# Patient Record
Sex: MALE | Race: OTHER | Employment: FULL TIME | URBAN - METROPOLITAN AREA
[De-identification: names, ages, dates, MRNs, and addresses within clinical notes are randomized per-mention and may not be internally consistent; named-entity substitution may affect disease eponyms.]

---

## 2017-09-20 ENCOUNTER — HOSPITAL ENCOUNTER (EMERGENCY)
Facility: HOSPITAL | Age: 62
Discharge: HOME | End: 2017-09-20
Attending: EMERGENCY MEDICINE
Payer: MEDICAID

## 2017-09-20 VITALS
TEMPERATURE: 98 F | DIASTOLIC BLOOD PRESSURE: 62 MMHG | WEIGHT: 180 LBS | SYSTOLIC BLOOD PRESSURE: 139 MMHG | HEART RATE: 75 BPM | HEIGHT: 67 IN | RESPIRATION RATE: 18 BRPM | BODY MASS INDEX: 28.25 KG/M2 | OXYGEN SATURATION: 95 %

## 2017-09-20 DIAGNOSIS — T18.108A FOREIGN BODY IN ESOPHAGUS, INITIAL ENCOUNTER: Primary | ICD-10-CM

## 2017-09-20 PROCEDURE — 96361 HYDRATE IV INFUSION ADD-ON: CPT

## 2017-09-20 PROCEDURE — 99284 EMERGENCY DEPT VISIT MOD MDM: CPT

## 2017-09-20 PROCEDURE — 96374 THER/PROPH/DIAG INJ IV PUSH: CPT

## 2017-09-20 PROCEDURE — 96375 TX/PRO/DX INJ NEW DRUG ADDON: CPT

## 2017-09-20 RX ORDER — PANTOPRAZOLE SODIUM 40 MG/1
40 TABLET, DELAYED RELEASE ORAL
Qty: 60 TABLET | Refills: 0 | Status: SHIPPED | OUTPATIENT
Start: 2017-09-20 | End: 2017-10-20

## 2017-09-20 RX ORDER — DIAZEPAM 5 MG/ML
5 INJECTION, SOLUTION INTRAMUSCULAR; INTRAVENOUS ONCE
Status: COMPLETED | OUTPATIENT
Start: 2017-09-20 | End: 2017-09-20

## 2017-09-20 NOTE — ED INITIAL ASSESSMENT (HPI)
Patient received via EMS with c/o cantalope stuck in throat. Patient spitting up saliva, no respiratory distress present, speaking in full sentances.

## 2017-09-20 NOTE — ED PROVIDER NOTES
Patient Seen in: [facility] AND [facility] Emergency Department    History   Patient presents with:  FB in Throat (GI, respiratory)    Stated Complaint: foreign bodu in throat     HPI    62-year-old male presents for complaint of a foreign body in his throat.   P 1953]    Current:/62   Pulse 75   Temp 98 °F (36.7 °C)   Resp 18   Ht 170.2 cm (5' 7\")   Wt 81.6 kg   SpO2 95%   BMI 28.19 kg/m²         Physical Exam   Constitutional: He is oriented to person, place, and time.  He appears well-developed and well-no discharged home tolerating oral intake without difficulty and in no respiratory distress. Imaging:   No results found. SpO2: Normal 95%    Clinical impression as well as any lab results and radiology findings were discussed with the patient.  Kirstin

## 2017-09-21 ENCOUNTER — TELEPHONE (OUTPATIENT)
Dept: GASTROENTEROLOGY | Facility: CLINIC | Age: 62
End: 2017-09-21

## 2017-09-21 NOTE — TELEPHONE ENCOUNTER
PB,     Please see Dr. Meliton Falk message below. Ok to add for tomorrow? Please advise, thank you.

## 2017-09-21 NOTE — TELEPHONE ENCOUNTER
Dr. Sonja Escalante, should this be something that I see? Is this a upper endoscopy with possible dilatation? Would you like us to overbook schedule for an expedited upper endoscopy. Please let me know.  If necessary, I can see him tomorrow at 9:30 AM, I would just l

## 2017-09-21 NOTE — TELEPHONE ENCOUNTER
Pt contacted and reviewed that it is the PATIENT responsibility to bring his workman's compensation information on the day of his appt, otherwise he will be self-pay of [facility] will be billed for his visit.      I reviewed that it is a consult to discuss the EG

## 2017-09-21 NOTE — TELEPHONE ENCOUNTER
LMTCB-    CSS/YANIV please offer appt with PB for 9/22/17 @ 9:30am (appt on hold), must arrive by 9:15am, thank you.

## 2017-09-21 NOTE — TELEPHONE ENCOUNTER
Patient with a transient food impaction (that passed in the ER without EGD). He needs f/u in GI clinic to discuss EGD. GI Clinical Staff:   Please call patient and schedule with any provider, ideally in next 1-2 weeks.

## 2017-09-21 NOTE — TELEPHONE ENCOUNTER
Pt states that he contacted HR and they stated this is an official work comp claim. Pt would like for office to contact [facility] [phone] in the HR department to obtain any information needed for pts claim.  Pt is requesting a call back regarding

## 2017-09-21 NOTE — TELEPHONE ENCOUNTER
ASHLEY Morris- yes this is a straight forward dysphagia patient that needs to be counseled on mechanical soft diet and taking PPI until EGD can be done to r/out malignancy. He came in last night to EGD with impaction that naturally passed.   If stricture on EGD

## 2017-09-21 NOTE — TELEPHONE ENCOUNTER
No sooner appt available with other providers. Pt has [facility] and cannot be seen by Dr. Shahida Garcia to add to your schedule for Friday, 9/22/17 @ 9:30am?    Please advise, thank you.

## 2017-09-21 NOTE — TELEPHONE ENCOUNTER
Pt ret'd call. Pt accepted appt on 9/22/17 at [facility] with PB. Appt added to schedule. He will arrive at 9:15am.  Pt states this will be work comp and he was told to call with all information re: work comp claim.   He was advised that he would be self pay or

## 2017-09-21 NOTE — ED NOTES
Pt was able to spit out a small pice of cantaloupe but still feels like there is still some stuck, pt remedicated with valium 5mg ivp.

## 2017-09-22 ENCOUNTER — OFFICE VISIT (OUTPATIENT)
Dept: GASTROENTEROLOGY | Facility: CLINIC | Age: 62
End: 2017-09-22

## 2017-09-22 ENCOUNTER — TELEPHONE (OUTPATIENT)
Dept: GASTROENTEROLOGY | Facility: CLINIC | Age: 62
End: 2017-09-22

## 2017-09-22 VITALS
WEIGHT: 185 LBS | SYSTOLIC BLOOD PRESSURE: 132 MMHG | HEIGHT: 66 IN | HEART RATE: 70 BPM | BODY MASS INDEX: 29.73 KG/M2 | DIASTOLIC BLOOD PRESSURE: 71 MMHG

## 2017-09-22 DIAGNOSIS — R13.10 DYSPHAGIA, UNSPECIFIED TYPE: Primary | ICD-10-CM

## 2017-09-22 DIAGNOSIS — R13.10 ODYNOPHAGIA: ICD-10-CM

## 2017-09-22 PROCEDURE — 99213 OFFICE O/P EST LOW 20 MIN: CPT | Performed by: PHYSICIAN ASSISTANT

## 2017-09-22 PROCEDURE — 99244 OFF/OP CNSLTJ NEW/EST MOD 40: CPT | Performed by: PHYSICIAN ASSISTANT

## 2017-09-22 PROCEDURE — 99212 OFFICE O/P EST SF 10 MIN: CPT | Performed by: PHYSICIAN ASSISTANT

## 2017-09-22 RX ORDER — IRBESARTAN AND HYDROCHLOROTHIAZIDE 300; 12.5 MG/1; MG/1
1 TABLET, FILM COATED ORAL DAILY
COMMUNITY

## 2017-09-22 NOTE — TELEPHONE ENCOUNTER
Scheduled for:  EGD w/poss Dil  Provider Name: Dr. Chu Vargas  Date:  9/27/17  Location:  [facility]  Sedation:  MAC  Time:  9689 (pt is aware to arrive at 1215)   Prep:  Nothing to eat after midnight   Nothing to drink after 0900 the day of the procedure  Meds/Allerg

## 2017-09-22 NOTE — H&P
[address] [facility] [address] Gastroenterology                                                                                                  Clinic History and Physical     Pa No nausea, vomiting, hematemesis or epigastric pain. Denies blood in his stool, black stools, change in bowel habits or abdominal pain. Never undergone c-scope - has been advised by Dr. Keaton Montanez. No unintentional weight loss, CP or SOB.  He has been eating a Allergies    ROS:   CONSTITUTIONAL:  negative for fevers, rigors  EYES:  negative for diplopia   RESPIRATORY:  negative for severe shortness of breath  CARDIOVASCULAR:  negative for crushing sub-sternal chest pain  GASTROINTESTINAL:  see HPI  GENITOURINARY improvement. Request was made by ER and Dr. Ingrid Shane for the patient to have prompt evaluation outpatient for consideration of upper endoscopy to evaluate for stricture, malignancy or injury to esophagus. No labs or imaging on file.  Patient presents today fee answered to the patient’s satisfaction. The patient has agreed to sign an informed consent and elected to proceed with upper endoscopy/enteroscopy with possible intervention [i.e. polypectomy, ablation, stent placement, etc.] as indicated.         Orders Th

## 2017-09-22 NOTE — PATIENT INSTRUCTIONS
1. Schedule an upper endoscopy with Dr. Catalina Nye with MAC sedation (possible dilation). - September 26th - this was OK by Dr. Catalina Nye    2. Continue taking the Pantoprazole medication two times daily as prescribed.  Continue on the mechanical soft diet as we d

## 2017-09-27 ENCOUNTER — HOSPITAL ENCOUNTER (OUTPATIENT)
Facility: HOSPITAL | Age: 62
Setting detail: HOSPITAL OUTPATIENT SURGERY
Discharge: HOME | End: 2017-09-27
Attending: INTERNAL MEDICINE | Admitting: INTERNAL MEDICINE
Payer: MEDICAID

## 2017-09-27 ENCOUNTER — ANESTHESIA (OUTPATIENT)
Dept: ENDOSCOPY | Facility: HOSPITAL | Age: 62
End: 2017-09-27
Payer: MEDICAID

## 2017-09-27 ENCOUNTER — ANESTHESIA EVENT (OUTPATIENT)
Dept: ENDOSCOPY | Facility: HOSPITAL | Age: 62
End: 2017-09-27
Payer: MEDICAID

## 2017-09-27 ENCOUNTER — SURGERY (OUTPATIENT)
Age: 62
End: 2017-09-27

## 2017-09-27 DIAGNOSIS — K44.9 HIATAL HERNIA WITHOUT GANGRENE AND OBSTRUCTION: ICD-10-CM

## 2017-09-27 DIAGNOSIS — R13.10 ODYNOPHAGIA: ICD-10-CM

## 2017-09-27 DIAGNOSIS — R13.10 DYSPHAGIA, UNSPECIFIED TYPE: ICD-10-CM

## 2017-09-27 DIAGNOSIS — K22.81 ESOPHAGEAL POLYP: Primary | ICD-10-CM

## 2017-09-27 DIAGNOSIS — K22.2 SCHATZKI'S RING: ICD-10-CM

## 2017-09-27 PROCEDURE — 0DB18ZX EXCISION OF UPPER ESOPHAGUS, VIA NATURAL OR ARTIFICIAL OPENING ENDOSCOPIC, DIAGNOSTIC: ICD-10-PCS | Performed by: INTERNAL MEDICINE

## 2017-09-27 PROCEDURE — 43239 EGD BIOPSY SINGLE/MULTIPLE: CPT | Performed by: INTERNAL MEDICINE

## 2017-09-27 PROCEDURE — 0DB48ZX EXCISION OF ESOPHAGOGASTRIC JUNCTION, VIA NATURAL OR ARTIFICIAL OPENING ENDOSCOPIC, DIAGNOSTIC: ICD-10-PCS | Performed by: INTERNAL MEDICINE

## 2017-09-27 PROCEDURE — 0DB68ZX EXCISION OF STOMACH, VIA NATURAL OR ARTIFICIAL OPENING ENDOSCOPIC, DIAGNOSTIC: ICD-10-PCS | Performed by: INTERNAL MEDICINE

## 2017-09-27 RX ORDER — NALOXONE HYDROCHLORIDE 0.4 MG/ML
80 INJECTION, SOLUTION INTRAMUSCULAR; INTRAVENOUS; SUBCUTANEOUS AS NEEDED
Status: DISCONTINUED | OUTPATIENT
Start: 2017-09-27 | End: 2017-09-27

## 2017-09-27 RX ORDER — ONDANSETRON 2 MG/ML
4 INJECTION INTRAMUSCULAR; INTRAVENOUS ONCE AS NEEDED
Status: DISCONTINUED | OUTPATIENT
Start: 2017-09-27 | End: 2017-09-27

## 2017-09-27 RX ORDER — LIDOCAINE HYDROCHLORIDE 10 MG/ML
INJECTION, SOLUTION EPIDURAL; INFILTRATION; INTRACAUDAL; PERINEURAL AS NEEDED
Status: DISCONTINUED | OUTPATIENT
Start: 2017-09-27 | End: 2017-09-27 | Stop reason: SURG

## 2017-09-27 RX ORDER — SODIUM CHLORIDE, SODIUM LACTATE, POTASSIUM CHLORIDE, CALCIUM CHLORIDE 600; 310; 30; 20 MG/100ML; MG/100ML; MG/100ML; MG/100ML
INJECTION, SOLUTION INTRAVENOUS CONTINUOUS
Status: DISCONTINUED | OUTPATIENT
Start: 2017-09-27 | End: 2017-09-27

## 2017-09-27 RX ADMIN — SODIUM CHLORIDE, SODIUM LACTATE, POTASSIUM CHLORIDE, CALCIUM CHLORIDE: 600; 310; 30; 20 INJECTION, SOLUTION INTRAVENOUS at 15:59:00

## 2017-09-27 RX ADMIN — LIDOCAINE HYDROCHLORIDE 50 MG: 10 INJECTION, SOLUTION EPIDURAL; INFILTRATION; INTRACAUDAL; PERINEURAL at 16:00:00

## 2017-09-27 RX ADMIN — SODIUM CHLORIDE, SODIUM LACTATE, POTASSIUM CHLORIDE, CALCIUM CHLORIDE: 600; 310; 30; 20 INJECTION, SOLUTION INTRAVENOUS at 16:20:00

## 2017-09-27 NOTE — ANESTHESIA PREPROCEDURE EVALUATION
Anesthesia PreOp Note    HPI:     Yasemin Age is a 62year old male who presents for preoperative consultation requested by: Justin Shane MD    Date of Surgery: 9/27/2017    Procedure(s):  ESOPHAGOGASTRODUODENOSCOPY (EGD)  Indication: Odynophagia  Dy 09/27/17  1302 09/27/17  1314   BP:  156/71   BP Location:  Left arm   Pulse:  60   Resp:  11   SpO2:  100%   Weight: 81.6 kg (180 lb)    Height: 1.702 m (5' 7\")         Anesthesia ROS/Med Hx and Physical Exam     Patient summary reviewed and Nursing not

## 2017-09-27 NOTE — OPERATIVE REPORT
ESOPHAGOGASTRODUODENOSCOPY (EGD) REPORT    [place]    MARC 1955 Age 62year old   PCP Shantel Naik Endoscopist Luz Tolentino MD     Date of procedure: 17    Procedure: EGD w/biopsy    Pre-operative diagnosis: Dysphagia, hx of food s/p random biopsies. Retroflexion revealed a normal fundus and a non-patulous cardia. 3. Duodenum: The duodenal mucosa appeared normal in the 1st and 2nd portion of the duodenum. Impression:  1.  The etiology of dysphagia and transient food impactio

## 2017-09-27 NOTE — H&P
History & Physical Examination    Patient Name: Adrienne Medina  MRN: [id number]  [facility]: [id number]  YOB: 1955    Diagnosis: dysphagia, hx of food impaction      Prescriptions Prior to Admission:  Irbesartan-Hydrochlorothiazide 300-12.5 MG Oral

## 2017-09-27 NOTE — ANESTHESIA POSTPROCEDURE EVALUATION
Patient: [facility]    Procedure Summary     Date:  09/27/17 Room / Location:  [address] [place] [address] ENDOSCOPY 05 / [address] [place] [address] ENDOSCOPY    Anesthesia Start:  9258 Anesthesia Stop:  8408    Procedure:  ESOPHAGOGASTRODUODENOSCOPY (EGD) (N/A ) Diagnosis:       Odynophagia      Dy

## 2017-09-28 ENCOUNTER — TELEPHONE (OUTPATIENT)
Dept: GASTROENTEROLOGY | Facility: CLINIC | Age: 62
End: 2017-09-28

## 2017-09-28 VITALS
BODY MASS INDEX: 28.25 KG/M2 | OXYGEN SATURATION: 100 % | WEIGHT: 180 LBS | HEART RATE: 72 BPM | DIASTOLIC BLOOD PRESSURE: 72 MMHG | HEIGHT: 67 IN | RESPIRATION RATE: 19 BRPM | SYSTOLIC BLOOD PRESSURE: 130 MMHG

## 2017-09-28 NOTE — TELEPHONE ENCOUNTER
Sent call to Rn - Pt has Discomfort after eating - He had 9/27/2017 EGD with ROSEANN Panchal call. Thank you.

## 2017-09-28 NOTE — TELEPHONE ENCOUNTER
Spoke w/pt and he was concerned b/c he had scrambled eggs this morning and about an hour afterwards he felt the sensation of \"something stuck in my throat, like what happened last week,\" also feels pressure around the chest area, and tightness around the

## 2017-09-28 NOTE — TELEPHONE ENCOUNTER
Noted, thank you. I called the pt back and he states he has some tea and honey and his discomfort was slowly improving. States he is having pasta for dinner and has not had any difficulty at this time.  I reviewed ER s/s again with him, he verbalized un

## 2017-10-02 ENCOUNTER — TELEPHONE (OUTPATIENT)
Dept: GASTROENTEROLOGY | Facility: CLINIC | Age: 62
End: 2017-10-02

## 2017-10-02 NOTE — TELEPHONE ENCOUNTER
Entered into EPIC:Recall EGD in approx 1-2 years per Dr. Chu Vargas. Last EGD done 9/27/17, next due 1/2019. Snapshot updated.

## 2017-10-02 NOTE — TELEPHONE ENCOUNTER
Left detailed msg for patient re: EGD biopsies showing non-dysplastic french's esophagus. No cancer is seen, no dysplasia is present. He should continue protonix 40mg/day and repeat EGD in 1-2 years.     GI Clinical Staff:   Please place recall EGD in [facility]

## 2018-11-01 ENCOUNTER — TELEPHONE (OUTPATIENT)
Dept: GASTROENTEROLOGY | Facility: CLINIC | Age: 63
End: 2018-11-01

## 2018-11-01 NOTE — TELEPHONE ENCOUNTER
----- Message from Asa Lovelace RN sent at 10/2/2017  3:05 PM CDT -----  Regardin-2 year EGD recall  Entered into EPIC:Recall EGD in approx 1-2 years per Dr. Severa Abed. Last EGD done 17, next due 2019. Snapshot updated.

## (undated) DEVICE — FORCEP RADIAL JAW 4

## (undated) DEVICE — ENDOSCOPY PACK UPPER: Brand: MEDLINE INDUSTRIES, INC.

## (undated) DEVICE — Device: Brand: DEFENDO AIR/WATER/SUCTION AND BIOPSY VALVE

## (undated) NOTE — ED AVS SNAPSHOT
Kenneth Gamboa   MRN: [id number]    Department:  [facility] Emergency Department   Date of Visit:  9/20/2017           Disclosure     Insurance plans vary and the physician(s) referred by the ER may not be covered by your plan.  Please contact yo CARE PHYSICIAN AT ONCE OR RETURN IMMEDIATELY TO THE EMERGENCY DEPARTMENT. If you have been prescribed any medication(s), please fill your prescription right away and begin taking the medication(s) as directed.   If you believe that any of the medications

## (undated) NOTE — LETTER
[facility], [address], [place]  [address] [place] [address], [facility]  DEPT: [phone]    11/1/2018        Ellen Brunson        [address] [place] [address] Street [facility]             Dear Jann Red records indicate

## (undated) NOTE — LETTER
9/22/2017          To Whom It May Concern:    Maria Tapia is currently under my medical care as well as Dr. Benson Zapata and may return to work on Thursday September 28, 2017. Activity is restricted as follows: no restrictons.     If you require ad

## (undated) NOTE — LETTER
September 20, 2017    Patient: Rhiannon Gallegos   Date of Visit: 9/20/2017       To Whom It May Concern:    Rhiannon Gallegos was seen and treated in our emergency department on 9/20/2017. He may return to work after 9/22/17 with no restrictions.     If you ha